# Patient Record
Sex: FEMALE | Race: BLACK OR AFRICAN AMERICAN | NOT HISPANIC OR LATINO | URBAN - NONMETROPOLITAN AREA
[De-identification: names, ages, dates, MRNs, and addresses within clinical notes are randomized per-mention and may not be internally consistent; named-entity substitution may affect disease eponyms.]

---

## 2017-04-04 PROBLEM — H52.13: Noted: 2017-04-04

## 2018-06-19 NOTE — PATIENT DISCUSSION
"""excision of lesion left lower lid today in office.  start emycin idalmis bid to area until returns ""

## 2018-06-19 NOTE — PATIENT DISCUSSION
"""excision of lesion right lower lid today in office.  start emycin idalmis bid to area until returns ""

## 2021-04-06 ENCOUNTER — IMPORTED ENCOUNTER (OUTPATIENT)
Dept: URBAN - NONMETROPOLITAN AREA CLINIC 1 | Facility: CLINIC | Age: 41
End: 2021-04-06

## 2021-04-06 PROCEDURE — S0620 ROUTINE OPHTHALMOLOGICAL EXA: HCPCS

## 2022-04-09 ASSESSMENT — VISUAL ACUITY
OS_SC: 20/20
OD_SC: 20/20

## 2023-04-25 ENCOUNTER — ESTABLISHED PATIENT (OUTPATIENT)
Dept: RURAL CLINIC 3 | Facility: CLINIC | Age: 43
End: 2023-04-25

## 2023-04-25 DIAGNOSIS — H52.13: ICD-10-CM

## 2023-04-25 PROCEDURE — S0621 ROUTINE OPHTHALMOLOGICAL EXA: HCPCS

## 2023-04-25 ASSESSMENT — TONOMETRY
OS_IOP_MMHG: 16
OD_IOP_MMHG: 16

## 2023-04-25 ASSESSMENT — VISUAL ACUITY
OD_CC: 20/20
OS_CC: 20/20

## 2024-04-19 ENCOUNTER — ESTABLISHED PATIENT (OUTPATIENT)
Dept: RURAL CLINIC 3 | Facility: CLINIC | Age: 44
End: 2024-04-19

## 2024-04-19 DIAGNOSIS — H52.13: ICD-10-CM

## 2024-04-19 PROCEDURE — 92014SPRT

## 2024-04-19 ASSESSMENT — VISUAL ACUITY
OD_CC: 20/30
OS_CC: 20/25
OS_CC: 20/20
OD_CC: 20/20
OU_CC: 20/20

## 2024-04-19 ASSESSMENT — KERATOMETRY
OD_K2POWER_DIOPTERS: 46.25
OS_K2POWER_DIOPTERS: 46.75
OS_K1POWER_DIOPTERS: 46.75
OS_AXISANGLE_DEGREES: 13
OD_AXISANGLE_DEGREES: 30
OD_AXISANGLE2_DEGREES: 120
OD_K1POWER_DIOPTERS: 46.75
OS_AXISANGLE2_DEGREES: 103

## 2024-04-19 ASSESSMENT — TONOMETRY
OS_IOP_MMHG: 16
OD_IOP_MMHG: 16